# Patient Record
Sex: MALE | Race: WHITE | Employment: OTHER | URBAN - METROPOLITAN AREA
[De-identification: names, ages, dates, MRNs, and addresses within clinical notes are randomized per-mention and may not be internally consistent; named-entity substitution may affect disease eponyms.]

---

## 2017-09-14 PROBLEM — E78.2 MIXED HYPERLIPIDEMIA: Status: ACTIVE | Noted: 2017-09-14

## 2017-09-14 PROCEDURE — 84154 ASSAY OF PSA FREE: CPT | Performed by: INTERNAL MEDICINE

## 2017-09-14 PROCEDURE — 84153 ASSAY OF PSA TOTAL: CPT | Performed by: INTERNAL MEDICINE

## 2017-09-21 PROBLEM — N20.0 NEPHROLITHIASIS: Status: ACTIVE | Noted: 2017-09-21

## 2017-09-21 PROBLEM — K80.20 CALCULUS OF GALLBLADDER WITHOUT CHOLECYSTITIS WITHOUT OBSTRUCTION: Status: ACTIVE | Noted: 2017-09-21

## 2017-09-21 PROBLEM — R97.20 ABNORMAL PSA: Status: ACTIVE | Noted: 2017-09-21

## 2017-09-21 PROCEDURE — 87086 URINE CULTURE/COLONY COUNT: CPT | Performed by: INTERNAL MEDICINE

## 2021-08-03 PROBLEM — E66.01 OBESITY, MORBID (HCC): Status: ACTIVE | Noted: 2021-08-03

## 2021-08-03 PROBLEM — N18.31 STAGE 3A CHRONIC KIDNEY DISEASE (HCC): Status: ACTIVE | Noted: 2021-08-03

## 2022-01-14 PROBLEM — N18.31 STAGE 3A CHRONIC KIDNEY DISEASE (HCC): Status: RESOLVED | Noted: 2021-08-03 | Resolved: 2022-01-14

## 2022-02-09 PROBLEM — I25.10 CORONARY ARTERY DISEASE INVOLVING NATIVE CORONARY ARTERY OF NATIVE HEART WITHOUT ANGINA PECTORIS: Status: ACTIVE | Noted: 2022-02-09

## 2022-02-09 PROBLEM — R53.82 CHRONIC FATIGUE: Status: ACTIVE | Noted: 2022-02-09

## 2022-02-09 PROBLEM — R07.2 PRECORDIAL PAIN: Status: ACTIVE | Noted: 2022-02-09

## 2025-01-08 ENCOUNTER — HOSPITAL ENCOUNTER (INPATIENT)
Facility: HOSPITAL | Age: 75
LOS: 1 days | Discharge: HOME | End: 2025-01-10
Attending: EMERGENCY MEDICINE | Admitting: HOSPITALIST
Payer: MEDICARE

## 2025-01-08 ENCOUNTER — APPOINTMENT (OUTPATIENT)
Dept: GENERAL RADIOLOGY | Facility: HOSPITAL | Age: 75
End: 2025-01-08
Payer: MEDICARE

## 2025-01-08 DIAGNOSIS — N28.9 ACUTE RENAL INSUFFICIENCY: ICD-10-CM

## 2025-01-08 DIAGNOSIS — J18.9 COMMUNITY ACQUIRED PNEUMONIA OF RIGHT UPPER LOBE OF LUNG: Primary | ICD-10-CM

## 2025-01-08 LAB
ALBUMIN SERPL-MCNC: 4.3 G/DL (ref 3.2–4.8)
ALBUMIN/GLOB SERPL: 1.3 {RATIO} (ref 1–2)
ALP LIVER SERPL-CCNC: 126 U/L
ALT SERPL-CCNC: 34 U/L
ANION GAP SERPL CALC-SCNC: 12 MMOL/L (ref 0–18)
AST SERPL-CCNC: 35 U/L (ref ?–34)
BASOPHILS # BLD AUTO: 0.06 X10(3) UL (ref 0–0.2)
BASOPHILS NFR BLD AUTO: 0.3 %
BILIRUB SERPL-MCNC: 1.6 MG/DL (ref 0.2–1.1)
BUN BLD-MCNC: 34 MG/DL (ref 9–23)
C DIFF TOX B STL QL: NEGATIVE
CALCIUM BLD-MCNC: 9.8 MG/DL (ref 8.7–10.4)
CHLORIDE SERPL-SCNC: 102 MMOL/L (ref 98–112)
CO2 SERPL-SCNC: 19 MMOL/L (ref 21–32)
CREAT BLD-MCNC: 2.1 MG/DL
EGFRCR SERPLBLD CKD-EPI 2021: 32 ML/MIN/1.73M2 (ref 60–?)
EOSINOPHIL # BLD AUTO: 0.07 X10(3) UL (ref 0–0.7)
EOSINOPHIL NFR BLD AUTO: 0.3 %
ERYTHROCYTE [DISTWIDTH] IN BLOOD BY AUTOMATED COUNT: 15.9 %
FLUAV + FLUBV RNA SPEC NAA+PROBE: NEGATIVE
FLUAV + FLUBV RNA SPEC NAA+PROBE: NEGATIVE
GLOBULIN PLAS-MCNC: 3.4 G/DL (ref 2–3.5)
GLUCOSE BLD-MCNC: 168 MG/DL (ref 70–99)
HCT VFR BLD AUTO: 43.5 %
HGB BLD-MCNC: 14.1 G/DL
IMM GRANULOCYTES # BLD AUTO: 0.31 X10(3) UL (ref 0–1)
IMM GRANULOCYTES NFR BLD: 1.3 %
LACTATE SERPL-SCNC: 2 MMOL/L (ref 0.5–2)
LYMPHOCYTES # BLD AUTO: 1.04 X10(3) UL (ref 1–4)
LYMPHOCYTES NFR BLD AUTO: 4.4 %
MCH RBC QN AUTO: 29.5 PG (ref 26–34)
MCHC RBC AUTO-ENTMCNC: 32.4 G/DL (ref 31–37)
MCV RBC AUTO: 91 FL
MONOCYTES # BLD AUTO: 1.75 X10(3) UL (ref 0.1–1)
MONOCYTES NFR BLD AUTO: 7.4 %
NEUTROPHILS # BLD AUTO: 20.34 X10 (3) UL (ref 1.5–7.7)
NEUTROPHILS # BLD AUTO: 20.34 X10(3) UL (ref 1.5–7.7)
NEUTROPHILS NFR BLD AUTO: 86.3 %
NT-PROBNP SERPL-MCNC: 711 PG/ML (ref ?–450)
OSMOLALITY SERPL CALC.SUM OF ELEC: 287 MOSM/KG (ref 275–295)
PLATELET # BLD AUTO: 172 10(3)UL (ref 150–450)
POTASSIUM SERPL-SCNC: 4.1 MMOL/L (ref 3.5–5.1)
PROT SERPL-MCNC: 7.7 G/DL (ref 5.7–8.2)
RBC # BLD AUTO: 4.78 X10(6)UL
RSV RNA SPEC NAA+PROBE: NEGATIVE
SARS-COV-2 RNA RESP QL NAA+PROBE: NOT DETECTED
SODIUM SERPL-SCNC: 133 MMOL/L (ref 136–145)
TROPONIN I SERPL HS-MCNC: 17 NG/L
WBC # BLD AUTO: 23.6 X10(3) UL (ref 4–11)

## 2025-01-08 PROCEDURE — 87040 BLOOD CULTURE FOR BACTERIA: CPT | Performed by: EMERGENCY MEDICINE

## 2025-01-08 PROCEDURE — 84484 ASSAY OF TROPONIN QUANT: CPT | Performed by: EMERGENCY MEDICINE

## 2025-01-08 PROCEDURE — 87449 NOS EACH ORGANISM AG IA: CPT | Performed by: STUDENT IN AN ORGANIZED HEALTH CARE EDUCATION/TRAINING PROGRAM

## 2025-01-08 PROCEDURE — 83880 ASSAY OF NATRIURETIC PEPTIDE: CPT | Performed by: EMERGENCY MEDICINE

## 2025-01-08 PROCEDURE — 36415 COLL VENOUS BLD VENIPUNCTURE: CPT

## 2025-01-08 PROCEDURE — 96366 THER/PROPH/DIAG IV INF ADDON: CPT

## 2025-01-08 PROCEDURE — 83605 ASSAY OF LACTIC ACID: CPT | Performed by: EMERGENCY MEDICINE

## 2025-01-08 PROCEDURE — 87493 C DIFF AMPLIFIED PROBE: CPT | Performed by: HOSPITALIST

## 2025-01-08 PROCEDURE — 99285 EMERGENCY DEPT VISIT HI MDM: CPT

## 2025-01-08 PROCEDURE — 96361 HYDRATE IV INFUSION ADD-ON: CPT

## 2025-01-08 PROCEDURE — 80053 COMPREHEN METABOLIC PANEL: CPT | Performed by: EMERGENCY MEDICINE

## 2025-01-08 PROCEDURE — 0241U SARS-COV-2/FLU A AND B/RSV BY PCR (GENEXPERT): CPT | Performed by: EMERGENCY MEDICINE

## 2025-01-08 PROCEDURE — 96375 TX/PRO/DX INJ NEW DRUG ADDON: CPT

## 2025-01-08 PROCEDURE — 93005 ELECTROCARDIOGRAM TRACING: CPT

## 2025-01-08 PROCEDURE — 93010 ELECTROCARDIOGRAM REPORT: CPT

## 2025-01-08 PROCEDURE — 71045 X-RAY EXAM CHEST 1 VIEW: CPT | Performed by: EMERGENCY MEDICINE

## 2025-01-08 PROCEDURE — 96365 THER/PROPH/DIAG IV INF INIT: CPT

## 2025-01-08 PROCEDURE — 96367 TX/PROPH/DG ADDL SEQ IV INF: CPT

## 2025-01-08 PROCEDURE — 85025 COMPLETE CBC W/AUTO DIFF WBC: CPT | Performed by: EMERGENCY MEDICINE

## 2025-01-08 RX ORDER — IPRATROPIUM BROMIDE AND ALBUTEROL SULFATE 2.5; .5 MG/3ML; MG/3ML
3 SOLUTION RESPIRATORY (INHALATION) EVERY 4 HOURS PRN
Status: DISCONTINUED | OUTPATIENT
Start: 2025-01-08 | End: 2025-01-10

## 2025-01-08 RX ORDER — GUAIFENESIN 600 MG/1
600 TABLET, EXTENDED RELEASE ORAL 2 TIMES DAILY
Status: DISCONTINUED | OUTPATIENT
Start: 2025-01-08 | End: 2025-01-10

## 2025-01-08 RX ORDER — SODIUM CHLORIDE 9 MG/ML
INJECTION, SOLUTION INTRAVENOUS CONTINUOUS
Status: ACTIVE | OUTPATIENT
Start: 2025-01-08 | End: 2025-01-08

## 2025-01-08 RX ORDER — HEPARIN SODIUM 5000 [USP'U]/ML
5000 INJECTION, SOLUTION INTRAVENOUS; SUBCUTANEOUS EVERY 8 HOURS SCHEDULED
Status: DISCONTINUED | OUTPATIENT
Start: 2025-01-08 | End: 2025-01-10

## 2025-01-08 RX ORDER — ONDANSETRON 2 MG/ML
4 INJECTION INTRAMUSCULAR; INTRAVENOUS ONCE
Status: COMPLETED | OUTPATIENT
Start: 2025-01-08 | End: 2025-01-08

## 2025-01-08 RX ORDER — ACETAMINOPHEN 500 MG
500 TABLET ORAL EVERY 4 HOURS PRN
Status: DISCONTINUED | OUTPATIENT
Start: 2025-01-08 | End: 2025-01-10

## 2025-01-08 RX ORDER — AZITHROMYCIN 250 MG/1
500 TABLET, FILM COATED ORAL
Status: DISCONTINUED | OUTPATIENT
Start: 2025-01-09 | End: 2025-01-10

## 2025-01-08 RX ORDER — DOXYCYCLINE 100 MG/1
100 CAPSULE ORAL 2 TIMES DAILY
COMMUNITY
End: 2025-01-10

## 2025-01-08 RX ORDER — SODIUM CHLORIDE 9 MG/ML
INJECTION, SOLUTION INTRAVENOUS CONTINUOUS
Status: DISCONTINUED | OUTPATIENT
Start: 2025-01-08 | End: 2025-01-10

## 2025-01-08 NOTE — ED INITIAL ASSESSMENT (HPI)
Pt to ED brought by Son for c/o shortness of breath and fever TMax 100.2. Pt was diagnosed with Pneumonia and Ear infection on Mon 1/6/25 and is currently on 2 antibiotics per Pt's Son. Coricidin given PTA for fever.

## 2025-01-08 NOTE — H&P
[facility] Hospitalist H&P    Chief Complaint   Patient presents with    Difficulty Breathing    Fever        PCP: Chaparrita Ram MD (Ramesh)      History of Present Illness: Patient is a 74 year old male with history of CAD and HLD who presented for dyspnea. Patient was seen at immediate care and was prescribed amoxicillin and doxycycline. States he took for 2 days however symptoms persisted which warranted him to come into ER.     Admission CXR with RUL consolidation. Labs with leukocytosis. Viral panel negative. Patient given rocephin and azithromycin in the ER. Admitted for further management.     Medical History:  Past Medical History:    Coronary atherosclerosis    Heart attack (HCC)    High cholesterol    Obesity    Otitis media    Pneumonia      Past Surgical History:   Procedure Laterality Date    Bypass graft/three or more seg      Bypass surgery  2012    Tripple CABG    Tonsillectomy        Social History     Tobacco Use    Smoking status: Never    Smokeless tobacco: Never   Substance Use Topics    Alcohol use: No      Family History   Problem Relation Age of Onset    Cancer Father     Heart Surgery Mother        Allergies[1]     Review of Systems  Comprehensive ROS reviewed and negative except for what is stated in HPI.      OBJECTIVE:  /71 (BP Location: Left arm)   Pulse 100   Temp 98.1 °F (36.7 °C) (Oral)   Resp 20   Ht 5' 8\" (1.727 m)   Wt 277 lb (125.6 kg)   SpO2 98%   BMI 42.12 kg/m²   General: Mild distress.   HEENT:  EOMI, PERRLA.   Pulm: Diminished breath sounds on the right. Normal respiratory effort.  CV: Regular rate and rhythm, no murmur.   Abd: Soft, nontender, nondistended.  MSK: Full range of motion in extremities, no obvious deformities.    Skin: No lesions or rashes.  Neuro: No obvious focal deficits.    LABS:   Lab Results   Component Value Date    WBC 23.6 01/08/2025    HGB 14.1 01/08/2025    HCT 43.5 01/08/2025    .0 01/08/2025    CREATSERUM 2.10 01/08/2025    BUN  34 01/08/2025     01/08/2025    K 4.1 01/08/2025     01/08/2025    CO2 19.0 01/08/2025     01/08/2025    CA 9.8 01/08/2025    ALB 4.3 01/08/2025    ALKPHO 126 01/08/2025    BILT 1.6 01/08/2025    TP 7.7 01/08/2025    AST 35 01/08/2025    ALT 34 01/08/2025       All lab work and imaging personally reviewed.    Assessment/ Plan:     #Lobar pneumonia  #Dyspnea  #Leukocytosis  #Generalized weakness  -CXR as above. Obtain urinary antigen  -Rocephin/ azithromycin  -SPO2 goal >92%  -Nebs, supportive care PRN    #HTN  #HLD  #CAD  -Resume home meds        Marquise Guerrero DO  [facility] Hospitalist       [1]   Allergies  Allergen Reactions    Dilaudid [Hydromorphone] OTHER (SEE COMMENTS)     Back pain , Sweats,Headache     Tessalon Perles  [Benzonatate]

## 2025-01-08 NOTE — ED QUICK NOTES
Rounding Completed    Plan of Care reviewed. Waiting for abx and bed placement.  Elimination needs assessed.  Provided ice chips.    Bed is locked and in lowest position. Call light within reach.

## 2025-01-08 NOTE — ED QUICK NOTES
Orders for admission, patient is aware of plan and ready to go upstairs. Any questions, please call ED RN Tan at extension [phone].     Patient Covid vaccination status: Fully vaccinated     COVID Test Ordered in ED: SARS-CoV-2/Flu A and B/RSV by PCR (GeneXpert)    COVID Suspicion at Admission: N/A    Running Infusions:  none      Mental Status/LOC at time of transport: a/ox4    Other pertinent information:   CIWA score: N/A   NIH score:  N/A

## 2025-01-08 NOTE — ED PROVIDER NOTES
Patient Seen in: [facility] Emergency Department      History     Chief Complaint   Patient presents with    Difficulty Breathing    Fever     Stated Complaint: roz, recent pneumonia dx, fever, 97% ra, 118 hr    Subjective:   HPI      Patient is a 74-year-old male with a history of high cholesterol, remote CABG presenting for evaluation of worsening cough, shortness of breath, continued fevers.  Developed the respiratory symptoms and the fever for 5 days ago.  He was seen at a local immediate care 2 days ago and diagnosed with pneumonia, started on Augmentin and doxycycline.  However he states he is continued to get worse since then.  Has developed both vomiting and diarrhea and has not been able to keep anything down.  Cough continues, still productive and he states whenever he does anything, even minimal activity at home he gets quite short of breath.  Son is at bedside to help with history and he is also noticed the patient becoming quite short of breath with minimal exertion.    Objective:     Past Medical History:    High cholesterol    Obesity    Otitis media    Pneumonia              Past Surgical History:   Procedure Laterality Date    Bypass graft/three or more seg      Bypass surgery  2012    Tripple CABG    Tonsillectomy                  Social History     Socioeconomic History    Marital status:    Tobacco Use    Smoking status: Never    Smokeless tobacco: Never   Vaping Use    Vaping status: Never Used   Substance and Sexual Activity    Alcohol use: No    Drug use: No                  Physical Exam     ED Triage Vitals [01/08/25 0552]   /82   Pulse 118   Resp 22   Temp 98.9 °F (37.2 °C)   Temp src Temporal   SpO2 93 %   O2 Device None (Room air)       Current Vitals:   Vital Signs  BP: 154/88  Pulse: 110  Resp: 24  Temp: 98.9 °F (37.2 °C)  Temp src: Temporal  MAP (mmHg): (!) 108    Oxygen Therapy  SpO2: 96 %  O2 Device: None (Room air)        Physical Exam  Vitals and nursing note  reviewed.   Constitutional:       Appearance: He is well-developed.   HENT:      Head: Normocephalic and atraumatic.      Mouth/Throat:      Comments: Dry mucous membranes.  Eyes:      Conjunctiva/sclera: Conjunctivae normal.      Pupils: Pupils are equal, round, and reactive to light.   Cardiovascular:      Rate and Rhythm: Regular rhythm. Tachycardia present.      Heart sounds: Normal heart sounds.   Pulmonary:      Effort: Pulmonary effort is normal.      Breath sounds: Normal breath sounds.      Comments: Few crackles throughout the right lung.  Patient is able to speak full sentences, he is a little tachypneic at rest but no overt respiratory distress.  Abdominal:      General: Bowel sounds are normal.      Palpations: Abdomen is soft.   Musculoskeletal:         General: Normal range of motion.      Cervical back: Normal range of motion and neck supple.   Skin:     General: Skin is warm and dry.   Neurological:      Mental Status: He is alert and oriented to person, place, and time.             ED Course     Labs Reviewed   COMP METABOLIC PANEL (14) - Abnormal; Notable for the following components:       Result Value    Glucose 168 (*)     Sodium 133 (*)     CO2 19.0 (*)     BUN 34 (*)     Creatinine 2.10 (*)     eGFR-Cr 32 (*)     AST 35 (*)     Alkaline Phosphatase 126 (*)     Bilirubin, Total 1.6 (*)     All other components within normal limits   PRO BETA NATRIURETIC PEPTIDE - Abnormal; Notable for the following components:    Pro-Beta Natriuretic Peptide 711 (*)     All other components within normal limits   LACTIC ACID, PLASMA - Normal   TROPONIN I HIGH SENSITIVITY - Normal   SARS-COV-2/FLU A AND B/RSV BY PCR (GENEXPERT) - Normal    Narrative:     This test is intended for the qualitative detection and differentiation of SARS-CoV-2, influenza A, influenza B, and respiratory syncytial virus (RSV) viral RNA in nasopharyngeal or nares swabs from individuals suspected of respiratory viral infection  consistent with COVID-19 by their healthcare provider. Signs and symptoms of respiratory viral infection due to SARS-CoV-2, influenza, and RSV can be similar.    Test performed using the Xpert Xpress SARS-CoV-2/FLU/RSV (real time RT-PCR)  assay on the GeneXpert instrument, [employer], [place] [address].   This test is being used under the Food and Drug Administration's Emergency Use Authorization.    The authorized Fact Sheet for Healthcare Providers for this assay is available upon request from the laboratory.   CBC WITH DIFFERENTIAL WITH PLATELET   RAINBOW DRAW BLUE   BLOOD CULTURE   BLOOD CULTURE     EKG    Rate, intervals and axes as noted on EKG Report.  Rate: 116  Rhythm: Sinus Rhythm  Reading: Sinus tachycardia.  Right bundle branch block.  ST depressions in the septal precordial leads.  No ST elevations.  I do not have an old EKG available for comparison.                XR CHEST AP PORTABLE  (CPT=71045)    Result Date: 1/8/2025  PROCEDURE:  XR CHEST AP PORTABLE  (CPT=71045)  TECHNIQUE:  AP chest radiograph was obtained.  COMPARISON:  None.  INDICATIONS:  roz, recent pneumonia dx, fever, 97% ra, 118 hr  PATIENT STATED HISTORY: (As transcribed by Technologist)  Patient offered no additional history at this time.    FINDINGS:  There is an airspace opacity within the right upper lobe measuring approximately 2.4 x 2.3 cm.  This may reflect pneumonia but should be followed to complete resolution to exclude any underlying lung mass.  The rest of the lung fields are clear.  The heart size is mildly enlarged.  There are sternotomy wires present.  Surgical clips are seen projecting along the right heart border/inferior hilum.  Degenerative changes are seen in the spine and shoulders.             CONCLUSION:  Airspace opacity in the right upper lobe could reflect consolidative pneumonia.  This however should be followed to complete resolution to exclude any underlying lung mass. 2. Status post cardiac surgery.   Cardiomegaly without pulmonary edema.   LOCATION:  [facility]      Dictated by (CST): Kyaw Boyle MD on 1/08/2025 at 6:40 AM     Finalized by (CST): Kyaw Boyle MD on 1/08/2025 at 6:41 AM             MDM      74-year-old male with respiratory symptoms for the last 4 to 5 days.  Diagnosed with pneumonia 2 days ago and has been on antibiotics but continues to worsen with increasing exertional dyspnea, continued cough and is also developed vomiting and diarrhea.  States he has been able to keep anything down for the last 2 days.  There is a little hypoxic in triage at 93% although in the room now he is 96% on room air.  A little tachypneic but able speak full sentences and there is no significant respiratory distress.  He is tachycardic but his blood pressure is fine.  Sounds like his pneumonia is not improving with outpatient treatment and he may need admission for further treatment.  Labs have been ordered including blood cultures and a lactic acid.  I am unable to see the chest x-ray that was done at an outside immediate care so we will get a repeat x-ray here to evaluate for lobar infiltrate.  Differential also includes flu and COVID and a GEN expert has been ordered.  Will hydrate him as he does look clinically dehydrated.    Admission disposition: 1/8/2025  7:13 AM         Update at 7:10 AM.  CBC is still pending.  Lactic acid is normal.  Troponin and BNP are unremarkable.  GEN expert is negative.  However chest x-ray demonstrates a right upper lobe infiltrate and he has KEITH with a BUN of 34 and a creatinine of 2.1.  Looks like his baseline is more about 1.3-1.4.  He will need to be admitted as he is worsening on outpatient treatment/failing outpatient treatment for IV antibiotics as well as IV fluids.        Past Medical History-high cholesterol, CAD    Differential diagnosis before testing included pneumonia, CHF, flu, COVID    Co-morbidities that add to the complexity of management include:  None    Testing ordered during this visit included labs, chest x-ray, EKG    Radiographic images  I personally reviewed the radiographs and my individual interpretation shows right upper lobe infiltrate, no effusion  I also reviewed the official reports that showed right upper lobe infiltrate, no effusion        History obtained by an independent source included from son at bedside    Discussion of management with hospitalist      Medications Provided: Rocephin, azithromycin          Disposition:    Admission  I have discussed with the patient the results of test, differential diagnosis, and treatment plan. They expressed clear understanding of these instructions and agrees to the plan provided.           Medical Decision Making      Disposition and Plan     Clinical Impression:  1. Community acquired pneumonia of right upper lobe of lung    2. Acute renal insufficiency         Disposition:  Admit  1/8/2025  7:13 am    Follow-up:  No follow-up provider specified.        Medications Prescribed:  Current Discharge Medication List              Supplementary Documentation:         Hospital Problems       Present on Admission  Date Reviewed: 2/9/2022            ICD-10-CM Noted POA    * (Principal) Community acquired pneumonia of right upper lobe of lung J18.9 1/8/2025 Unknown

## 2025-01-09 LAB
ANION GAP SERPL CALC-SCNC: 7 MMOL/L (ref 0–18)
ATRIAL RATE: 116 BPM
BASOPHILS # BLD AUTO: 0.05 X10(3) UL (ref 0–0.2)
BASOPHILS NFR BLD AUTO: 0.4 %
BUN BLD-MCNC: 41 MG/DL (ref 9–23)
CALCIUM BLD-MCNC: 9 MG/DL (ref 8.7–10.4)
CHLORIDE SERPL-SCNC: 110 MMOL/L (ref 98–112)
CO2 SERPL-SCNC: 21 MMOL/L (ref 21–32)
CREAT BLD-MCNC: 1.66 MG/DL
EGFRCR SERPLBLD CKD-EPI 2021: 43 ML/MIN/1.73M2 (ref 60–?)
EOSINOPHIL # BLD AUTO: 0.22 X10(3) UL (ref 0–0.7)
EOSINOPHIL NFR BLD AUTO: 1.7 %
ERYTHROCYTE [DISTWIDTH] IN BLOOD BY AUTOMATED COUNT: 16 %
GLUCOSE BLD-MCNC: 112 MG/DL (ref 70–99)
HCT VFR BLD AUTO: 33.8 %
HGB BLD-MCNC: 11.1 G/DL
IMM GRANULOCYTES # BLD AUTO: 0.09 X10(3) UL (ref 0–1)
IMM GRANULOCYTES NFR BLD: 0.7 %
L PNEUMO AG UR QL: NEGATIVE
LYMPHOCYTES # BLD AUTO: 2.03 X10(3) UL (ref 1–4)
LYMPHOCYTES NFR BLD AUTO: 15.5 %
MAGNESIUM SERPL-MCNC: 2.1 MG/DL (ref 1.6–2.6)
MCH RBC QN AUTO: 30 PG (ref 26–34)
MCHC RBC AUTO-ENTMCNC: 32.8 G/DL (ref 31–37)
MCV RBC AUTO: 91.4 FL
MONOCYTES # BLD AUTO: 1.23 X10(3) UL (ref 0.1–1)
MONOCYTES NFR BLD AUTO: 9.4 %
NEUTROPHILS # BLD AUTO: 9.51 X10 (3) UL (ref 1.5–7.7)
NEUTROPHILS # BLD AUTO: 9.51 X10(3) UL (ref 1.5–7.7)
NEUTROPHILS NFR BLD AUTO: 72.3 %
OSMOLALITY SERPL CALC.SUM OF ELEC: 297 MOSM/KG (ref 275–295)
P AXIS: 24 DEGREES
P-R INTERVAL: 170 MS
PLATELET # BLD AUTO: 147 10(3)UL (ref 150–450)
POTASSIUM SERPL-SCNC: 4.2 MMOL/L (ref 3.5–5.1)
Q-T INTERVAL: 330 MS
QRS DURATION: 128 MS
QTC CALCULATION (BEZET): 458 MS
R AXIS: 23 DEGREES
RBC # BLD AUTO: 3.7 X10(6)UL
SODIUM SERPL-SCNC: 138 MMOL/L (ref 136–145)
STREP PNEUMO ANTIGEN, URINE: NEGATIVE
T AXIS: 9 DEGREES
VENTRICULAR RATE: 116 BPM
WBC # BLD AUTO: 13.1 X10(3) UL (ref 4–11)

## 2025-01-09 PROCEDURE — 97116 GAIT TRAINING THERAPY: CPT

## 2025-01-09 PROCEDURE — 96375 TX/PRO/DX INJ NEW DRUG ADDON: CPT

## 2025-01-09 PROCEDURE — 94640 AIRWAY INHALATION TREATMENT: CPT

## 2025-01-09 PROCEDURE — 80048 BASIC METABOLIC PNL TOTAL CA: CPT | Performed by: STUDENT IN AN ORGANIZED HEALTH CARE EDUCATION/TRAINING PROGRAM

## 2025-01-09 PROCEDURE — 96376 TX/PRO/DX INJ SAME DRUG ADON: CPT

## 2025-01-09 PROCEDURE — 83735 ASSAY OF MAGNESIUM: CPT | Performed by: STUDENT IN AN ORGANIZED HEALTH CARE EDUCATION/TRAINING PROGRAM

## 2025-01-09 PROCEDURE — 97161 PT EVAL LOW COMPLEX 20 MIN: CPT

## 2025-01-09 PROCEDURE — 85025 COMPLETE CBC W/AUTO DIFF WBC: CPT | Performed by: STUDENT IN AN ORGANIZED HEALTH CARE EDUCATION/TRAINING PROGRAM

## 2025-01-09 RX ORDER — LOPERAMIDE HYDROCHLORIDE 2 MG/1
2 CAPSULE ORAL 4 TIMES DAILY PRN
Status: DISCONTINUED | OUTPATIENT
Start: 2025-01-09 | End: 2025-01-09

## 2025-01-09 RX ORDER — LOPERAMIDE HYDROCHLORIDE 2 MG/1
2 CAPSULE ORAL ONCE
Status: COMPLETED | OUTPATIENT
Start: 2025-01-09 | End: 2025-01-09

## 2025-01-09 NOTE — PHYSICAL THERAPY NOTE
PHYSICAL THERAPY EVALUATION - INPATIENT     Room Number: OFD4/OFD4-A  Evaluation Date: 1/9/2025  Type of Evaluation: Initial  Physician Order: PT Eval and Treat    Presenting Problem: PNA  Co-Morbidities : CAD and HLD  Reason for Therapy: Mobility Dysfunction and Discharge Planning    PHYSICAL THERAPY ASSESSMENT   Patient is a 74 year old male admitted 1/8/2025 for PNA.  Prior to admission, patient's baseline is indep.  Patient is currently functioning below baseline with gait and stair negotiation.  Patient is requiring supervision and RW   as a result of the following impairments: decreased endurance/aerobic capacity and decreased muscular endurance.  Physical Therapy will continue to follow for duration of hospitalization.    Patient will benefit from continued skilled PT Services at discharge to promote prior level of function.  Anticipate patient will return home with home health PT.    PLAN DURING HOSPITALIZATION  Nursing Mobility Recommendation : 1 Assist  PT Device Recommendation: Rolling walker  PT Treatment Plan: Bed mobility;Body mechanics;Coordination;Endurance;Energy conservation;Patient education;Family education;Gait training;Neuromuscular re-educate;Range of motion;Strengthening;Stoop training;Stair training;Transfer training;Balance training  Rehab Potential : Good  Frequency (Obs):  (1-2x per week)     CURRENT GOALS    Goal #1 Patient is able to demonstrate supine - sit EOB @ level: supervision     Goal #2 Patient is able to demonstrate transfers EOB to/from Chair/Wheelchair at assistance level: supervision     Goal #3 Patient is able to ambulate 150 feet with assist device:  LRAD  at assistance level: supervision     Goal #4 Patient is able to navigate stairs with rail and SBA   Goal #5    Goal #6    Goal Comments: Goals established on 1/9/2025      PHYSICAL THERAPY MEDICAL/SOCIAL HISTORY  History related to current admission: Patient is a 74 year old male admitted on 1/8/2025: Presented with  dyspnea dx PNA    HOME SITUATION  Type of Home: House  Home Layout: Two level                     Lives With: Spouse (2 kids)    Drives: Yes   Patient Regularly Uses: None      Prior Level of [place]: Indep, no hx of falls, works as an      SUBJECTIVE  \" I feel short of breath\" - RPE 6 with activity     OBJECTIVE  Precautions: None  Fall Risk: Standard fall risk    WEIGHT BEARING RESTRICTION     PAIN ASSESSMENT  Rating: Other (Comment)  Location: denied pain but reported did not feel well       COGNITION  Overall Cognitive Status:  WFL - within functional limits    RANGE OF MOTION AND STRENGTH ASSESSMENT  Upper extremity ROM and strength are within functional limits     Lower extremity ROM is within functional limits     Lower extremity strength is within functional limits     BALANCE  Static Sitting: Good  Dynamic Sitting: Good  Static Standing: Fair -  Dynamic Standing: Fair -    ADDITIONAL TESTS                                    ACTIVITY TOLERANCE                         O2 WALK  Oxygen Therapy  SPO2% on Room Air at Rest: 96  SPO2% Ambulation on Room Air: 97    NEUROLOGICAL FINDINGS                        AM-PAC '6-Clicks' INPATIENT SHORT FORM - BASIC MOBILITY  How much difficulty does the patient currently have...  Patient Difficulty: Turning over in bed (including adjusting bedclothes, sheets and blankets)?: None   Patient Difficulty: Sitting down on and standing up from a chair with arms (e.g., wheelchair, bedside commode, etc.): None   Patient Difficulty: Moving from lying on back to sitting on the side of the bed?: None   How much help from another person does the patient currently need...   Help from Another: Moving to and from a bed to a chair (including a wheelchair)?: None   Help from Another: Need to walk in hospital room?: A Little   Help from Another: Climbing 3-5 steps with a railing?: A Little     AM-PAC Score:  Raw Score: 22   Approx Degree of Impairment: 20.91%   Standardized Score  (AM-PAC Scale): 53.28   CMS Modifier (G-Code): CJ    FUNCTIONAL ABILITY STATUS  Gait Assessment   Functional Mobility/Gait Assessment  Gait Assistance: Supervision  Distance (ft): 150  Assistive Device: Rolling walker  Pattern: Within Functional Limits    Skilled Therapy Provided     Bed Mobility:  Rolling: NT  Supine to sit: NT   Sit to supine: NT     Transfer Mobility:  Sit to stand: SBA   Stand to sit: SBA  Gait = SBA    Therapist's Comments: Discussed case with RN prior to session initiation. Pt agreeable to participation in therapy. Gait belt donned for out of bed mobility. pt educated on energy conservation techniques including slower gait speed, standing/seated rest breaks, pacing activities, and use of assistive device.     Exercise/Education Provided:  Energy conservation  Functional activity tolerated  Gait training  Transfer training    Patient End of Session: In bed;Needs met;Call light within reach;RN aware of session/findings;All patient questions and concerns addressed;Hospital anti-slip socks      Patient Evaluation Complexity Level:  History Low - no personal factors and/or co-morbidities   Examination of body systems Low -  addressing 1-2 elements   Clinical Presentation Low- Stable   Clinical Decision Making Low Complexity       PT Session Time: 20 minutes  Gait Training: 10 minutes  Therapeutic Activity:  minutes  Neuromuscular Re-education: minutes  Therapeutic Exercise:  minutes

## 2025-01-09 NOTE — PLAN OF CARE
Problem: pneumonia  Data: Patient alert and oriented overnight, on 2L O2 per NC by request. Patient's O2 saturation on room air 95-97%. Patient with some dyspnea with exertion, up with walker and standby assist, voiding freely, continues with small amounts of diarrhea. Vital sign stable, NSR/ST low 100's, afebrile.  Action: Due medications given, IVF infusing, IV abx overnight, all patient's needs attended to.   Education (patient/family): Patient updated on plan of care. Plan for DC back home with spouse when appropriate.   Response: Patient verbalizes understanding of plan of care and appears to be resting comfortably at this time.    Problem: Patient/Family Goals  Goal: Patient/Family Long Term Goal  Description: Patient's Long Term Goal: DC home    Interventions:  - comply with plan of care  - See additional Care Plan goals for specific interventions  Outcome: Progressing  Goal: Patient/Family Short Term Goal  Description: Patient's Short Term Goal: feel less short of breath    Interventions:   - wean O2 as able  - IV abx  - See additional Care Plan goals for specific interventions  Outcome: Progressing     Problem: RESPIRATORY - ADULT  Goal: Achieves optimal ventilation and oxygenation  Description: INTERVENTIONS:  - Assess for changes in respiratory status  - Assess for changes in mentation and behavior  - Position to facilitate oxygenation and minimize respiratory effort  - Oxygen supplementation based on oxygen saturation or ABGs  - Provide Smoking Cessation handout, if applicable  - Encourage broncho-pulmonary hygiene including cough, deep breathe, Incentive Spirometry  - Assess the need for suctioning and perform as needed  - Assess and instruct to report SOB or any respiratory difficulty  - Respiratory Therapy support as indicated  - Manage/alleviate anxiety  - Monitor for signs/symptoms of CO2 retention  Outcome: Progressing

## 2025-01-09 NOTE — OCCUPATIONAL THERAPY NOTE
OT orders received, pt chart reviewed. Per discussion with pt, pt up in bathroom modIND, reports no ADL concerns at this time. OT will sign off at this time, please re-order if POC changes. RN aware.

## 2025-01-09 NOTE — PROGRESS NOTES
[facility] Hospitalist Progress Note    Subjective:  No acute overnight events. Feeling better. Still on 2 L NC.     Review of Systems  Comprehensive ROS reviewed and negative except for what is stated in HPI.      OBJECTIVE:  BP (!) 128/92 (BP Location: Left arm)   Pulse 95   Temp 97.6 °F (36.4 °C) (Oral)   Resp 23   Ht 5' 8\" (1.727 m)   Wt 277 lb (125.6 kg)   SpO2 100%   BMI 42.12 kg/m²   General: No apparent distress.   HEENT:  EOMI, PERRLA.   Pulm: Diminished breath sounds on the right. Normal respiratory effort.  CV: Regular rate and rhythm, no murmur.   Abd: Soft, nontender, nondistended.  MSK: Full range of motion in extremities, no obvious deformities.    Skin: No lesions or rashes.  Neuro: No obvious focal deficits.    LABS:        All lab work and imaging personally reviewed.    Assessment/ Plan:     #Lobar pneumonia  #Dyspnea  #Leukocytosis  #Generalized weakness  -CXR as above. Obtain urinary antigen  -Rocephin/ azithromycin. Plan to transition to PO tomorrow.   -SPO2 goal >92%. Ambulatory pulse ox today.   -Nebs, supportive care PRN     #HTN  #HLD  #CAD  -Resume home meds      Dispo:  inpatient, depending course dc tomorrow     DO Cat Spann [facility]

## 2025-01-09 NOTE — PLAN OF CARE
Began taking care of patient this am.  Patient reports \"feeling slightly better\"  Patient on 1L of oxygen.  Patient reports still having episodes of sob, especially when up and about.  Patient has been up in halls.     Problem: RESPIRATORY - ADULT  Goal: Achieves optimal ventilation and oxygenation  Description: INTERVENTIONS:  - Assess for changes in respiratory status  - Assess for changes in mentation and behavior  - Position to facilitate oxygenation and minimize respiratory effort  - Oxygen supplementation based on oxygen saturation or ABGs  - Provide Smoking Cessation handout, if applicable  - Encourage broncho-pulmonary hygiene including cough, deep breathe, Incentive Spirometry  - Assess the need for suctioning and perform as needed  - Assess and instruct to report SOB or any respiratory difficulty  - Respiratory Therapy support as indicated  - Manage/alleviate anxiety  - Monitor for signs/symptoms of CO2 retention  Outcome: Progressing

## 2025-01-09 NOTE — CM/SW NOTE
01/09/25 1400   CM/SW Referral Data   Referral Source Social Work (self-referral)   Reason for Referral Discharge planning   Informant EMR;Clinical Staff Member   Discharge Needs   Anticipated D/C needs Home health care     HOME SITUATION per PT eval  Type of Home: House  Home Layout: Two level         Lives With: Spouse (2 kids)    Drives: Yes   Patient Regularly Uses: None       Prior Level of [place] per PT eval: Indep, no hx of falls, works as an         Chart reviewed for discharge planning.  Pt is a 75 y/o man admitted with pneumonia.  Noted therapy indicating pt would benefit from [facility] services at [place].  Referral sent to  providers via [employer].  Await responses for further DC planning. / to remain available for support and/or discharge planning.     Aniyah Hernandez, [facility]  Discharge Planner  [phone]

## 2025-01-10 VITALS
HEIGHT: 68 IN | SYSTOLIC BLOOD PRESSURE: 119 MMHG | HEART RATE: 86 BPM | RESPIRATION RATE: 18 BRPM | DIASTOLIC BLOOD PRESSURE: 39 MMHG | WEIGHT: 277 LBS | OXYGEN SATURATION: 99 % | TEMPERATURE: 97 F | BODY MASS INDEX: 41.98 KG/M2

## 2025-01-10 PROCEDURE — 94760 N-INVAS EAR/PLS OXIMETRY 1: CPT

## 2025-01-10 RX ORDER — LEVOFLOXACIN 750 MG/1
750 TABLET, FILM COATED ORAL DAILY
Qty: 7 TABLET | Refills: 0 | Status: SHIPPED | OUTPATIENT
Start: 2025-01-10 | End: 2025-01-20

## 2025-01-10 NOTE — PLAN OF CARE
Pt is A&Ox4. VSS, afebrile. SPO2 maintained on 1L PRN, RA-BL. Tele, NSR. EP. Heparin. Last BM 1/9. General diet. Voids. Up SB walker. Denies pain. PIV, IVF. No further needs at this time, continue POC. Safety precautions in place.    Problem: Patient/Family Goals  Goal: Patient/Family Long Term Goal  Description: Patient's Long Term Goal: DC home    Interventions:  - comply with plan of care  - See additional Care Plan goals for specific interventions  Outcome: Progressing  Goal: Patient/Family Short Term Goal  Description: Patient's Short Term Goal: feel less short of breath    Interventions:   - wean O2 as able  - IV abx  - See additional Care Plan goals for specific interventions  Outcome: Progressing     Problem: RESPIRATORY - ADULT  Goal: Achieves optimal ventilation and oxygenation  Description: INTERVENTIONS:  - Assess for changes in respiratory status  - Assess for changes in mentation and behavior  - Position to facilitate oxygenation and minimize respiratory effort  - Oxygen supplementation based on oxygen saturation or ABGs  - Provide Smoking Cessation handout, if applicable  - Encourage broncho-pulmonary hygiene including cough, deep breathe, Incentive Spirometry  - Assess the need for suctioning and perform as needed  - Assess and instruct to report SOB or any respiratory difficulty  - Respiratory Therapy support as indicated  - Manage/alleviate anxiety  - Monitor for signs/symptoms of CO2 retention  Outcome: Progressing

## 2025-01-10 NOTE — CM/SW NOTE
01/10/25 1100   CM/GRAY Referral Data   Referral Source Social Work (self-referral)   Reason for Referral Discharge planning   Informant Patient;EMR;Clinical Staff Member   Choice of Post-Acute Provider   Patient declines recommended services Yes     Met with patient to discuss discharge planning. 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 He is not homebound and declined HH. He denies any other discharge planning needs at this time.     SW/CM to remain available for support and/or discharge planning.    DESTIN Canseco  Discharge Planner

## 2025-01-14 NOTE — PROGRESS NOTES
Provider Clarification     Additional information on the patient's renal status.    Acute kidney Injury     This note is part of the patient's medical record.

## 2025-01-14 NOTE — PROGRESS NOTES
Physician Clarification    Additional information related to the patient's condition    [x  ] BMI 42.12 kg/m² with Morbid Obesity     This note is part of the patient's medical record.